# Patient Record
Sex: MALE | Race: WHITE | Employment: OTHER | URBAN - NONMETROPOLITAN AREA
[De-identification: names, ages, dates, MRNs, and addresses within clinical notes are randomized per-mention and may not be internally consistent; named-entity substitution may affect disease eponyms.]

---

## 2021-01-11 ENCOUNTER — APPOINTMENT (OUTPATIENT)
Dept: GENERAL RADIOLOGY | Age: 66
DRG: 291 | End: 2021-01-11
Payer: MEDICARE

## 2021-01-11 ENCOUNTER — HOSPITAL ENCOUNTER (INPATIENT)
Age: 66
LOS: 1 days | Discharge: LEFT BEFORE DISCHARGE | DRG: 291 | End: 2021-01-12
Attending: EMERGENCY MEDICINE
Payer: MEDICARE

## 2021-01-11 DIAGNOSIS — J18.9 PNEUMONIA OF BOTH LOWER LOBES DUE TO INFECTIOUS ORGANISM: ICD-10-CM

## 2021-01-11 DIAGNOSIS — I50.9 ACUTE ON CHRONIC CONGESTIVE HEART FAILURE, UNSPECIFIED HEART FAILURE TYPE (HCC): ICD-10-CM

## 2021-01-11 DIAGNOSIS — R06.02 SHORTNESS OF BREATH: Primary | ICD-10-CM

## 2021-01-11 PROBLEM — I26.09 ACUTE PULMONARY EMBOLISM WITH ACUTE COR PULMONALE (HCC): Status: ACTIVE | Noted: 2020-11-05

## 2021-01-11 PROBLEM — R06.01 ORTHOPNEA: Status: ACTIVE | Noted: 2020-05-10

## 2021-01-11 PROBLEM — J96.01 ACUTE RESPIRATORY FAILURE WITH HYPOXIA (HCC): Status: ACTIVE | Noted: 2020-05-10

## 2021-01-11 PROBLEM — Z72.0 TOBACCO USE: Status: ACTIVE | Noted: 2020-05-10

## 2021-01-11 PROBLEM — I42.8 NICM (NONISCHEMIC CARDIOMYOPATHY) (HCC): Status: ACTIVE | Noted: 2020-05-12

## 2021-01-11 PROBLEM — I42.9 CONGESTIVE HEART FAILURE DUE TO CARDIOMYOPATHY (HCC): Status: ACTIVE | Noted: 2021-01-11

## 2021-01-11 PROBLEM — J44.9 COPD (CHRONIC OBSTRUCTIVE PULMONARY DISEASE) (HCC): Status: ACTIVE | Noted: 2021-01-11

## 2021-01-11 PROBLEM — I26.99 PULMONARY EMBOLISM (HCC): Status: ACTIVE | Noted: 2021-01-11

## 2021-01-11 PROBLEM — Z91.14 NON COMPLIANCE W MEDICATION REGIMEN: Status: ACTIVE | Noted: 2021-01-11

## 2021-01-11 PROBLEM — I50.41 ACUTE COMBINED SYSTOLIC (CONGESTIVE) AND DIASTOLIC (CONGESTIVE) HEART FAILURE (HCC): Status: ACTIVE | Noted: 2020-05-10

## 2021-01-11 PROBLEM — E11.9 TYPE 2 DIABETES MELLITUS, WITHOUT LONG-TERM CURRENT USE OF INSULIN (HCC): Status: ACTIVE | Noted: 2020-11-05

## 2021-01-11 LAB
ALBUMIN SERPL-MCNC: 3 G/DL (ref 3.5–5.2)
ALP BLD-CCNC: 150 U/L (ref 40–130)
ALT SERPL-CCNC: 59 U/L (ref 5–41)
ANION GAP SERPL CALCULATED.3IONS-SCNC: 9 MMOL/L (ref 7–19)
APTT: 37.7 SEC (ref 26–36.2)
AST SERPL-CCNC: 57 U/L (ref 5–40)
BASOPHILS ABSOLUTE: 0 K/UL (ref 0–0.2)
BASOPHILS RELATIVE PERCENT: 0.3 % (ref 0–1)
BILIRUB SERPL-MCNC: 0.7 MG/DL (ref 0.2–1.2)
BUN BLDV-MCNC: 13 MG/DL (ref 8–23)
CALCIUM SERPL-MCNC: 8 MG/DL (ref 8.8–10.2)
CHLORIDE BLD-SCNC: 96 MMOL/L (ref 98–111)
CO2: 28 MMOL/L (ref 22–29)
CREAT SERPL-MCNC: 0.7 MG/DL (ref 0.5–1.2)
EKG P AXIS: 35 DEGREES
EKG P-R INTERVAL: 164 MS
EKG Q-T INTERVAL: 368 MS
EKG QRS DURATION: 96 MS
EKG QTC CALCULATION (BAZETT): 446 MS
EKG T AXIS: -17 DEGREES
EOSINOPHILS ABSOLUTE: 0.2 K/UL (ref 0–0.6)
EOSINOPHILS RELATIVE PERCENT: 1.5 % (ref 0–5)
GFR AFRICAN AMERICAN: >59
GFR NON-AFRICAN AMERICAN: >60
GLUCOSE BLD-MCNC: 152 MG/DL (ref 70–99)
GLUCOSE BLD-MCNC: 168 MG/DL (ref 70–99)
GLUCOSE BLD-MCNC: 176 MG/DL (ref 70–99)
GLUCOSE BLD-MCNC: 178 MG/DL (ref 74–109)
GLUCOSE BLD-MCNC: 193 MG/DL (ref 70–99)
GLUCOSE BLD-MCNC: 212 MG/DL (ref 70–99)
HCT VFR BLD CALC: 47.6 % (ref 42–52)
HEMOGLOBIN: 14.5 G/DL (ref 14–18)
IMMATURE GRANULOCYTES #: 0.1 K/UL
INR BLD: 1.24 (ref 0.88–1.18)
LV EF: 20 %
LVEF MODALITY: NORMAL
LYMPHOCYTES ABSOLUTE: 1.2 K/UL (ref 1.1–4.5)
LYMPHOCYTES RELATIVE PERCENT: 11.5 % (ref 20–40)
MAGNESIUM: 2 MG/DL (ref 1.6–2.4)
MCH RBC QN AUTO: 26.3 PG (ref 27–31)
MCHC RBC AUTO-ENTMCNC: 30.5 G/DL (ref 33–37)
MCV RBC AUTO: 86.2 FL (ref 80–94)
MONOCYTES ABSOLUTE: 0.9 K/UL (ref 0–0.9)
MONOCYTES RELATIVE PERCENT: 8.9 % (ref 0–10)
NEUTROPHILS ABSOLUTE: 8.1 K/UL (ref 1.5–7.5)
NEUTROPHILS RELATIVE PERCENT: 76.7 % (ref 50–65)
PDW BLD-RTO: 17.1 % (ref 11.5–14.5)
PERFORMED ON: ABNORMAL
PLATELET # BLD: 346 K/UL (ref 130–400)
PMV BLD AUTO: 9.4 FL (ref 9.4–12.4)
POTASSIUM SERPL-SCNC: 4.3 MMOL/L (ref 3.5–5)
POTASSIUM SERPL-SCNC: 4.3 MMOL/L (ref 3.5–5)
PRO-BNP: ABNORMAL PG/ML (ref 0–900)
PROTHROMBIN TIME: 15.6 SEC (ref 12–14.6)
RBC # BLD: 5.52 M/UL (ref 4.7–6.1)
REASON FOR REJECTION: NORMAL
REJECTED TEST: NORMAL
SARS-COV-2, NAAT: NOT DETECTED
SODIUM BLD-SCNC: 133 MMOL/L (ref 136–145)
T4 FREE: 1.05 NG/DL (ref 0.93–1.7)
TOTAL PROTEIN: 6 G/DL (ref 6.6–8.7)
TROPONIN: 0.07 NG/ML (ref 0–0.03)
TROPONIN: 0.09 NG/ML (ref 0–0.03)
TROPONIN: 0.09 NG/ML (ref 0–0.03)
TSH SERPL DL<=0.05 MIU/L-ACNC: 3 UIU/ML (ref 0.27–4.2)
WBC # BLD: 10.6 K/UL (ref 4.8–10.8)

## 2021-01-11 PROCEDURE — P9047 ALBUMIN (HUMAN), 25%, 50ML: HCPCS | Performed by: EMERGENCY MEDICINE

## 2021-01-11 PROCEDURE — 2700000000 HC OXYGEN THERAPY PER DAY

## 2021-01-11 PROCEDURE — 85025 COMPLETE CBC W/AUTO DIFF WBC: CPT

## 2021-01-11 PROCEDURE — 85610 PROTHROMBIN TIME: CPT

## 2021-01-11 PROCEDURE — 6360000002 HC RX W HCPCS

## 2021-01-11 PROCEDURE — 2500000003 HC RX 250 WO HCPCS: Performed by: INTERNAL MEDICINE

## 2021-01-11 PROCEDURE — 84132 ASSAY OF SERUM POTASSIUM: CPT

## 2021-01-11 PROCEDURE — C8929 TTE W OR WO FOL WCON,DOPPLER: HCPCS

## 2021-01-11 PROCEDURE — 6360000004 HC RX CONTRAST MEDICATION: Performed by: INTERNAL MEDICINE

## 2021-01-11 PROCEDURE — 99284 EMERGENCY DEPT VISIT MOD MDM: CPT

## 2021-01-11 PROCEDURE — 6370000000 HC RX 637 (ALT 250 FOR IP): Performed by: INTERNAL MEDICINE

## 2021-01-11 PROCEDURE — 2140000000 HC CCU INTERMEDIATE R&B

## 2021-01-11 PROCEDURE — 96376 TX/PRO/DX INJ SAME DRUG ADON: CPT

## 2021-01-11 PROCEDURE — 85730 THROMBOPLASTIN TIME PARTIAL: CPT

## 2021-01-11 PROCEDURE — 84439 ASSAY OF FREE THYROXINE: CPT

## 2021-01-11 PROCEDURE — 93010 ELECTROCARDIOGRAM REPORT: CPT | Performed by: INTERNAL MEDICINE

## 2021-01-11 PROCEDURE — 96375 TX/PRO/DX INJ NEW DRUG ADDON: CPT

## 2021-01-11 PROCEDURE — 93005 ELECTROCARDIOGRAM TRACING: CPT | Performed by: EMERGENCY MEDICINE

## 2021-01-11 PROCEDURE — 84484 ASSAY OF TROPONIN QUANT: CPT

## 2021-01-11 PROCEDURE — 83880 ASSAY OF NATRIURETIC PEPTIDE: CPT

## 2021-01-11 PROCEDURE — 82947 ASSAY GLUCOSE BLOOD QUANT: CPT

## 2021-01-11 PROCEDURE — 6360000002 HC RX W HCPCS: Performed by: INTERNAL MEDICINE

## 2021-01-11 PROCEDURE — 2580000003 HC RX 258: Performed by: EMERGENCY MEDICINE

## 2021-01-11 PROCEDURE — 80053 COMPREHEN METABOLIC PANEL: CPT

## 2021-01-11 PROCEDURE — 6360000002 HC RX W HCPCS: Performed by: EMERGENCY MEDICINE

## 2021-01-11 PROCEDURE — 99223 1ST HOSP IP/OBS HIGH 75: CPT | Performed by: INTERNAL MEDICINE

## 2021-01-11 PROCEDURE — 84443 ASSAY THYROID STIM HORMONE: CPT

## 2021-01-11 PROCEDURE — 71045 X-RAY EXAM CHEST 1 VIEW: CPT

## 2021-01-11 PROCEDURE — 96365 THER/PROPH/DIAG IV INF INIT: CPT

## 2021-01-11 PROCEDURE — 83735 ASSAY OF MAGNESIUM: CPT

## 2021-01-11 PROCEDURE — 94760 N-INVAS EAR/PLS OXIMETRY 1: CPT

## 2021-01-11 PROCEDURE — 94640 AIRWAY INHALATION TREATMENT: CPT

## 2021-01-11 PROCEDURE — 36415 COLL VENOUS BLD VENIPUNCTURE: CPT

## 2021-01-11 PROCEDURE — 2580000003 HC RX 258: Performed by: INTERNAL MEDICINE

## 2021-01-11 PROCEDURE — U0002 COVID-19 LAB TEST NON-CDC: HCPCS

## 2021-01-11 RX ORDER — METHYLPREDNISOLONE SODIUM SUCCINATE 125 MG/2ML
125 INJECTION, POWDER, LYOPHILIZED, FOR SOLUTION INTRAMUSCULAR; INTRAVENOUS ONCE
Status: COMPLETED | OUTPATIENT
Start: 2021-01-11 | End: 2021-01-11

## 2021-01-11 RX ORDER — LISINOPRIL 5 MG/1
5 TABLET ORAL DAILY
Status: DISCONTINUED | OUTPATIENT
Start: 2021-01-11 | End: 2021-01-11

## 2021-01-11 RX ORDER — SPIRONOLACTONE 25 MG/1
25 TABLET ORAL DAILY
Status: DISCONTINUED | OUTPATIENT
Start: 2021-01-11 | End: 2021-01-12 | Stop reason: HOSPADM

## 2021-01-11 RX ORDER — SODIUM CHLORIDE 0.9 % (FLUSH) 0.9 %
10 SYRINGE (ML) INJECTION PRN
Status: DISCONTINUED | OUTPATIENT
Start: 2021-01-11 | End: 2021-01-12 | Stop reason: HOSPADM

## 2021-01-11 RX ORDER — FUROSEMIDE 10 MG/ML
40 INJECTION INTRAMUSCULAR; INTRAVENOUS ONCE
Status: COMPLETED | OUTPATIENT
Start: 2021-01-11 | End: 2021-01-11

## 2021-01-11 RX ORDER — FUROSEMIDE 10 MG/ML
40 INJECTION INTRAMUSCULAR; INTRAVENOUS DAILY
Status: DISCONTINUED | OUTPATIENT
Start: 2021-01-11 | End: 2021-01-12 | Stop reason: HOSPADM

## 2021-01-11 RX ORDER — ACETAMINOPHEN 325 MG/1
650 TABLET ORAL EVERY 6 HOURS PRN
Status: DISCONTINUED | OUTPATIENT
Start: 2021-01-11 | End: 2021-01-12 | Stop reason: HOSPADM

## 2021-01-11 RX ORDER — FUROSEMIDE 10 MG/ML
20 INJECTION INTRAMUSCULAR; INTRAVENOUS ONCE
Status: COMPLETED | OUTPATIENT
Start: 2021-01-11 | End: 2021-01-11

## 2021-01-11 RX ORDER — ONDANSETRON 2 MG/ML
4 INJECTION INTRAMUSCULAR; INTRAVENOUS EVERY 6 HOURS PRN
Status: DISCONTINUED | OUTPATIENT
Start: 2021-01-11 | End: 2021-01-12 | Stop reason: HOSPADM

## 2021-01-11 RX ORDER — METHYLPREDNISOLONE SODIUM SUCCINATE 40 MG/ML
40 INJECTION, POWDER, LYOPHILIZED, FOR SOLUTION INTRAMUSCULAR; INTRAVENOUS EVERY 8 HOURS
Status: DISCONTINUED | OUTPATIENT
Start: 2021-01-11 | End: 2021-01-12 | Stop reason: HOSPADM

## 2021-01-11 RX ORDER — HYDROCODONE BITARTRATE AND ACETAMINOPHEN 10; 325 MG/1; MG/1
1 TABLET ORAL EVERY 4 HOURS PRN
Status: DISCONTINUED | OUTPATIENT
Start: 2021-01-11 | End: 2021-01-12 | Stop reason: HOSPADM

## 2021-01-11 RX ORDER — SODIUM CHLORIDE 0.9 % (FLUSH) 0.9 %
10 SYRINGE (ML) INJECTION EVERY 12 HOURS SCHEDULED
Status: DISCONTINUED | OUTPATIENT
Start: 2021-01-11 | End: 2021-01-12 | Stop reason: HOSPADM

## 2021-01-11 RX ORDER — BUDESONIDE AND FORMOTEROL FUMARATE DIHYDRATE 160; 4.5 UG/1; UG/1
2 AEROSOL RESPIRATORY (INHALATION) 2 TIMES DAILY
Status: DISCONTINUED | OUTPATIENT
Start: 2021-01-11 | End: 2021-01-11 | Stop reason: CLARIF

## 2021-01-11 RX ORDER — BUDESONIDE 0.5 MG/2ML
0.5 INHALANT ORAL 2 TIMES DAILY
Status: DISCONTINUED | OUTPATIENT
Start: 2021-01-11 | End: 2021-01-12 | Stop reason: HOSPADM

## 2021-01-11 RX ORDER — ACETAMINOPHEN 650 MG/1
650 SUPPOSITORY RECTAL EVERY 6 HOURS PRN
Status: DISCONTINUED | OUTPATIENT
Start: 2021-01-11 | End: 2021-01-12 | Stop reason: HOSPADM

## 2021-01-11 RX ORDER — NICOTINE 21 MG/24HR
1 PATCH, TRANSDERMAL 24 HOURS TRANSDERMAL DAILY
Status: DISCONTINUED | OUTPATIENT
Start: 2021-01-11 | End: 2021-01-12 | Stop reason: HOSPADM

## 2021-01-11 RX ORDER — POLYETHYLENE GLYCOL 3350 17 G/17G
17 POWDER, FOR SOLUTION ORAL DAILY PRN
Status: DISCONTINUED | OUTPATIENT
Start: 2021-01-11 | End: 2021-01-12 | Stop reason: HOSPADM

## 2021-01-11 RX ORDER — CARVEDILOL 6.25 MG/1
3.12 TABLET ORAL 2 TIMES DAILY
Status: DISCONTINUED | OUTPATIENT
Start: 2021-01-11 | End: 2021-01-12 | Stop reason: HOSPADM

## 2021-01-11 RX ORDER — BUDESONIDE AND FORMOTEROL FUMARATE DIHYDRATE 160; 4.5 UG/1; UG/1
2 AEROSOL RESPIRATORY (INHALATION)
COMMUNITY
Start: 2020-09-02

## 2021-01-11 RX ORDER — FUROSEMIDE 10 MG/ML
INJECTION INTRAMUSCULAR; INTRAVENOUS
Status: COMPLETED
Start: 2021-01-11 | End: 2021-01-11

## 2021-01-11 RX ORDER — IPRATROPIUM BROMIDE AND ALBUTEROL SULFATE 2.5; .5 MG/3ML; MG/3ML
1 SOLUTION RESPIRATORY (INHALATION)
Status: DISCONTINUED | OUTPATIENT
Start: 2021-01-11 | End: 2021-01-12 | Stop reason: HOSPADM

## 2021-01-11 RX ORDER — ALBUMIN (HUMAN) 12.5 G/50ML
25 SOLUTION INTRAVENOUS ONCE
Status: COMPLETED | OUTPATIENT
Start: 2021-01-11 | End: 2021-01-11

## 2021-01-11 RX ORDER — ARFORMOTEROL TARTRATE 15 UG/2ML
15 SOLUTION RESPIRATORY (INHALATION) 2 TIMES DAILY
Status: DISCONTINUED | OUTPATIENT
Start: 2021-01-11 | End: 2021-01-12 | Stop reason: HOSPADM

## 2021-01-11 RX ORDER — METHYLPREDNISOLONE SODIUM SUCCINATE 40 MG/ML
40 INJECTION, POWDER, LYOPHILIZED, FOR SOLUTION INTRAMUSCULAR; INTRAVENOUS EVERY 8 HOURS
Status: DISCONTINUED | OUTPATIENT
Start: 2021-01-11 | End: 2021-01-11

## 2021-01-11 RX ORDER — NITROGLYCERIN 0.4 MG/1
0.4 TABLET SUBLINGUAL EVERY 5 MIN PRN
Status: DISCONTINUED | OUTPATIENT
Start: 2021-01-11 | End: 2021-01-12 | Stop reason: HOSPADM

## 2021-01-11 RX ORDER — ASPIRIN 81 MG/1
81 TABLET ORAL DAILY
COMMUNITY
Start: 2020-05-14

## 2021-01-11 RX ORDER — POTASSIUM CHLORIDE 750 MG/1
10 CAPSULE, EXTENDED RELEASE ORAL DAILY
COMMUNITY
Start: 2020-09-02

## 2021-01-11 RX ADMIN — SODIUM CHLORIDE, PRESERVATIVE FREE 10 ML: 5 INJECTION INTRAVENOUS at 09:18

## 2021-01-11 RX ADMIN — IPRATROPIUM BROMIDE AND ALBUTEROL SULFATE 1 AMPULE: .5; 3 SOLUTION RESPIRATORY (INHALATION) at 19:26

## 2021-01-11 RX ADMIN — IPRATROPIUM BROMIDE AND ALBUTEROL SULFATE 1 AMPULE: .5; 3 SOLUTION RESPIRATORY (INHALATION) at 11:00

## 2021-01-11 RX ADMIN — ARFORMOTEROL TARTRATE 15 MCG: 15 SOLUTION RESPIRATORY (INHALATION) at 08:00

## 2021-01-11 RX ADMIN — PERFLUTREN 1.65 MG: 6.52 INJECTION, SUSPENSION INTRAVENOUS at 16:00

## 2021-01-11 RX ADMIN — METHYLPREDNISOLONE SODIUM SUCCINATE 40 MG: 40 INJECTION, POWDER, FOR SOLUTION INTRAMUSCULAR; INTRAVENOUS at 13:01

## 2021-01-11 RX ADMIN — CEFTRIAXONE 1 G: 1 INJECTION, POWDER, FOR SOLUTION INTRAMUSCULAR; INTRAVENOUS at 05:38

## 2021-01-11 RX ADMIN — INSULIN LISPRO 2 UNITS: 100 INJECTION, SOLUTION INTRAVENOUS; SUBCUTANEOUS at 23:39

## 2021-01-11 RX ADMIN — FAMOTIDINE 20 MG: 10 INJECTION, SOLUTION INTRAVENOUS at 23:39

## 2021-01-11 RX ADMIN — FAMOTIDINE 20 MG: 10 INJECTION, SOLUTION INTRAVENOUS at 09:18

## 2021-01-11 RX ADMIN — IPRATROPIUM BROMIDE AND ALBUTEROL SULFATE 1 AMPULE: .5; 3 SOLUTION RESPIRATORY (INHALATION) at 08:00

## 2021-01-11 RX ADMIN — CARVEDILOL 3.12 MG: 6.25 TABLET, FILM COATED ORAL at 09:18

## 2021-01-11 RX ADMIN — SPIRONOLACTONE 25 MG: 25 TABLET ORAL at 09:18

## 2021-01-11 RX ADMIN — ENOXAPARIN SODIUM 40 MG: 40 INJECTION SUBCUTANEOUS at 09:17

## 2021-01-11 RX ADMIN — ALBUMIN (HUMAN) 25 G: 0.25 INJECTION, SOLUTION INTRAVENOUS at 04:41

## 2021-01-11 RX ADMIN — FUROSEMIDE 20 MG: 10 INJECTION, SOLUTION INTRAMUSCULAR; INTRAVENOUS at 03:40

## 2021-01-11 RX ADMIN — FUROSEMIDE 40 MG: 10 INJECTION, SOLUTION INTRAMUSCULAR; INTRAVENOUS at 04:42

## 2021-01-11 RX ADMIN — APIXABAN 5 MG: 5 TABLET, FILM COATED ORAL at 23:39

## 2021-01-11 RX ADMIN — INSULIN LISPRO 3 UNITS: 100 INJECTION, SOLUTION INTRAVENOUS; SUBCUTANEOUS at 13:01

## 2021-01-11 RX ADMIN — BUDESONIDE 500 MCG: 0.5 SUSPENSION RESPIRATORY (INHALATION) at 08:00

## 2021-01-11 RX ADMIN — INSULIN LISPRO 3 UNITS: 100 INJECTION, SOLUTION INTRAVENOUS; SUBCUTANEOUS at 16:54

## 2021-01-11 RX ADMIN — SODIUM CHLORIDE, PRESERVATIVE FREE 10 ML: 5 INJECTION INTRAVENOUS at 23:40

## 2021-01-11 RX ADMIN — FUROSEMIDE 40 MG: 10 INJECTION, SOLUTION INTRAMUSCULAR; INTRAVENOUS at 09:17

## 2021-01-11 RX ADMIN — LISINOPRIL 5 MG: 5 TABLET ORAL at 09:18

## 2021-01-11 RX ADMIN — METHYLPREDNISOLONE SODIUM SUCCINATE 125 MG: 125 INJECTION, POWDER, FOR SOLUTION INTRAMUSCULAR; INTRAVENOUS at 05:37

## 2021-01-11 RX ADMIN — FUROSEMIDE 40 MG: 10 INJECTION, SOLUTION INTRAMUSCULAR; INTRAVENOUS at 01:11

## 2021-01-11 RX ADMIN — APIXABAN 5 MG: 5 TABLET, FILM COATED ORAL at 13:46

## 2021-01-11 RX ADMIN — Medication 500 MG: at 05:38

## 2021-01-11 RX ADMIN — METHYLPREDNISOLONE SODIUM SUCCINATE 40 MG: 40 INJECTION, POWDER, FOR SOLUTION INTRAMUSCULAR; INTRAVENOUS at 23:39

## 2021-01-11 RX ADMIN — HYDROCODONE BITARTRATE AND ACETAMINOPHEN 1 TABLET: 10; 325 TABLET ORAL at 09:24

## 2021-01-11 RX ADMIN — FUROSEMIDE 40 MG: 10 INJECTION INTRAMUSCULAR; INTRAVENOUS at 01:11

## 2021-01-11 RX ADMIN — CARVEDILOL 3.12 MG: 6.25 TABLET, FILM COATED ORAL at 23:38

## 2021-01-11 ASSESSMENT — ENCOUNTER SYMPTOMS
TROUBLE SWALLOWING: 0
EYE PAIN: 0
ALLERGIC/IMMUNOLOGIC NEGATIVE: 1
GASTROINTESTINAL NEGATIVE: 1
DIARRHEA: 0
BACK PAIN: 0
SINUS PRESSURE: 0
PHOTOPHOBIA: 0
NAUSEA: 0
EYES NEGATIVE: 1
SHORTNESS OF BREATH: 0
CONSTIPATION: 0
RESPIRATORY NEGATIVE: 1
SHORTNESS OF BREATH: 1
VOMITING: 0
COLOR CHANGE: 0
ABDOMINAL PAIN: 0
COUGH: 1
WHEEZING: 1
WHEEZING: 0
CHEST TIGHTNESS: 1

## 2021-01-11 ASSESSMENT — PAIN SCALES - GENERAL: PAINLEVEL_OUTOF10: 0

## 2021-01-11 NOTE — ED NOTES
PT reports that his left arm is cold and tingling. PT IV is not infiltrated, and flushes well. ABX have been paused to give PT arm a break.       Mariano Jeffery RN  01/11/21 9349

## 2021-01-11 NOTE — PROGRESS NOTES
Geryl Severs arrived to room # 723. Presented with: CHF  Mental Status: Patient is oriented, alert, coherent, logical, thought processes intact and able to concentrate and follow conversation. Vitals:    01/11/21 0732   BP: 96/68   Pulse: 101   Resp: 18   Temp: 97.5 °F (36.4 °C)   SpO2: 90%     Patient safety contract and falls prevention contract reviewed with patient Yes. Oriented Patient to room. Call light within reach. Yes.   Needs, issues or concerns expressed at this time: no.      Electronically signed by Janie Veloz RN on 1/11/2021 at 7:44 AM

## 2021-01-11 NOTE — ED PROVIDER NOTES
PAST MEDICALHISTORY     Past Medical History:   Diagnosis Date    Back pain     CHF (congestive heart failure) (HCC)     Chronic back pain          SURGICAL HISTORY     History reviewed. No pertinent surgical history. CURRENT MEDICATIONS     Previous Medications    APIXABAN STARTER PACK (ELIQUIS DVT/PE STARTER PACK) 5 MG TBPK TABLET    Take 5 mg by mouth    ASPIRIN 81 MG EC TABLET    Take 81 mg by mouth daily    BUDESONIDE-FORMOTEROL (SYMBICORT) 160-4.5 MCG/ACT AERO    Inhale 2 puffs into the lungs Twice daily    CARVEDILOL (COREG PO)    Take by mouth    FUROSEMIDE (LASIX PO)    Take by mouth    HYDROCODONE-ACETAMINOPHEN (NORCO)  MG PER TABLET    Take 1 tablet by mouth every 4 hours as needed for Pain    POTASSIUM CHLORIDE (MICRO-K) 10 MEQ EXTENDED RELEASE CAPSULE    Take 10 mEq by mouth daily       ALLERGIES     Penicillins    FAMILY HISTORY     History reviewed. No pertinent family history.        SOCIAL HISTORY       Social History     Socioeconomic History    Marital status: Single     Spouse name: None    Number of children: None    Years of education: None    Highest education level: None   Occupational History    None   Social Needs    Financial resource strain: None    Food insecurity     Worry: None     Inability: None    Transportation needs     Medical: None     Non-medical: None   Tobacco Use    Smoking status: Current Every Day Smoker     Packs/day: 1.00     Types: Cigarettes   Substance and Sexual Activity    Alcohol use: No    Drug use: No    Sexual activity: None   Lifestyle    Physical activity     Days per week: None     Minutes per session: None    Stress: None   Relationships    Social connections     Talks on phone: None     Gets together: None     Attends Cheondo[employer] service: None     Active member of club or organization: None     Attends meetings of clubs or organizations: None     Relationship status: None    Intimate partner violence Palpations: Abdomen is soft. Tenderness: There is no abdominal tenderness. There is no guarding. Musculoskeletal:         General: Tenderness present. Right lower leg: He exhibits tenderness. Edema (3+ with weeping) present. Left lower leg: He exhibits tenderness. Edema (3+ with weeping) present. Skin:     General: Skin is warm and dry. Capillary Refill: Capillary refill takes less than 2 seconds. Neurological:      General: No focal deficit present. Mental Status: He is alert and oriented to person, place, and time. Cranial Nerves: No cranial nerve deficit. Psychiatric:         Mood and Affect: Mood normal.         Behavior: Behavior normal.         Thought Content: Thought content normal.         DIAGNOSTIC RESULTS     EKG: All EKG's areinterpreted by the Emergency Department Physician who either signs or Co-signs this chart in the absence of a cardiologist.    Sinus tach with rate of 108, normal axis, PVCs present occasionally, no acute ST elevations or depressions. No image comparison available    RADIOLOGY:  Non-plain film images such as CT, Ultrasound and MRI are read by the radiologist. Plain radiographic images are visualized and preliminarily interpreted bythe emergency physician with the below findings:    FILM CXR 1 VIEW:    Comparison: 1/2/2013    Opacities within the mid to lower lungs likely infectious. Small right pleural effusion. Unremarkable cardiomediastinal silhouette and osseous structures.       XR CHEST PORTABLE    (Results Pending)           LABS:  Labs Reviewed   BRAIN NATRIURETIC PEPTIDE - Abnormal; Notable for the following components:       Result Value    Pro-BNP 11,297 (*)     All other components within normal limits   CBC WITH AUTO DIFFERENTIAL - Abnormal; Notable for the following components:    MCH 26.3 (*)     MCHC 30.5 (*)     RDW 17.1 (*)     Neutrophils % 76.7 (*)     Lymphocytes % 11.5 (*)     Neutrophils Absolute 8.1 (*) All other components within normal limits   COMPREHENSIVE METABOLIC PANEL - Abnormal; Notable for the following components:    Sodium 133 (*)     Chloride 96 (*)     Glucose 178 (*)     Calcium 8.0 (*)     Total Protein 6.0 (*)     Alb 3.0 (*)     Alkaline Phosphatase 150 (*)     ALT 59 (*)     AST 57 (*)     All other components within normal limits   TROPONIN - Abnormal; Notable for the following components:    Troponin 0.09 (*)     All other components within normal limits   APTT - Abnormal; Notable for the following components:    aPTT 37.7 (*)     All other components within normal limits   PROTIME-INR - Abnormal; Notable for the following components:    Protime 15.6 (*)     INR 1.24 (*)     All other components within normal limits   TROPONIN - Abnormal; Notable for the following components:    Troponin 0.09 (*)     All other components within normal limits   COVID-19   SPECIMEN REJECTION    Narrative:     ORDER WAS CANCELLED 01/11/2021 05:14, Rejected: Hemolyzed ?called rafiq baez rn/er for recollection. MAGNESIUM   POTASSIUM   POCT GLUCOSE   POCT GLUCOSE   POCT GLUCOSE       All other labs were within normal range or not returned as of this dictation.     EMERGENCY DEPARTMENT COURSE and DIFFERENTIAL DIAGNOSIS/MDM:   Vitals:    Vitals:    01/11/21 0405 01/11/21 0435 01/11/21 0535 01/11/21 0600   BP: (!) 106/93 (!) 115/92 (!) 114/90 114/78   Pulse: 118 112 110 117   Resp: 27 19 (!) 31 21   Temp:       TempSrc:       SpO2: 94% 93% 92% 96%   Weight:       Height:           MDM  Number of Diagnoses or Management Options  Acute on chronic congestive heart failure, unspecified heart failure type [facility]): new and requires workup  Pneumonia of both lower lobes due to infectious organism: new and requires workup  Shortness of breath: new and requires workup Diagnosis management comments: Pt's echo on 11/5/20 showed decrease EF at 26-30% . Patient appears to be fluid overloaded. He has 3+ lower extremity edema as well as edema in his hands and forearms bilaterally. I tried to diurese him here in the ED, but it was not successful. He continued to be tachypneic and tachycardic. Since he has not been on his medication for a few days and appears to be fluid overloaded I feel best to admit. I spoke to the hospitalist service who have agreed to admit him for further evaluation and treatment. Amount and/or Complexity of Data Reviewed  Decide to obtain previous medical records or to obtain history from someone other than the patient: yes    Patient Progress  Patient progress: stable  Echo 11/5/20    Left ventricular systolic function is moderately to severely decreased. Left ventricular ejection fraction appears to be 26 - 30%. · The left ventricular cavity is mild to moderately dilated. · Normal right ventricular cavity size and systolic function noted. · Mild to moderate mitral valve regurgitation is present. · Mild tricuspid valve regurgitation is present. Estimated right   ventricular systolic pressure from tricuspid regurgitation is moderately   elevated (45-55 mmHg). Reassessment      CONSULTS:  IP CONSULT TO HEART FAILURE NURSE/COORDINATOR  IP CONSULT TO DIETITIAN  IP CONSULT TO CARDIOLOGY    PROCEDURES:  Unless otherwise noted below, none     Procedures    FINAL IMPRESSION      1. Shortness of breath    2. Acute on chronic congestive heart failure, unspecified heart failure type ([address]    3. Pneumonia of both lower lobes due to infectious organism          DISPOSITION/PLAN   DISPOSITION Admitted 01/11/2021 05:42:23 AM      PATIENT REFERRED TO:  No follow-up provider specified.     DISCHARGE MEDICATIONS:  New Prescriptions    No medications on file (Please note that portions of this note were completed with a voice recognition program.  Efforts were made to edit thedictations but occasionally words are mis-transcribed.)    Mirella Lim MD (electronically signed)  Attending Emergency Physician          Deb Reaves MD  01/11/21 9672

## 2021-01-11 NOTE — CARE COORDINATION
Requested to ensure affordability of pt meds: Entresto and Eliquis. SW contacted pt's pharmacy [facility] who confirmed pt copay for both meds Entresto & Eliquis as $4. Informed cardio TIM Lopez.

## 2021-01-11 NOTE — CONSULTS
[facility]  Cardiology Consult      Requesting MD:  Mendel Mustard, MD   Admit Status:  Inpatient [101]       History obtained from:   [] Patient  [] Other (specify):     Patient:  Lewis Cox  [id number]     Chief Complaint:   Chief Complaint   Patient presents with    Shortness of Breath     hx of CHF         HPI: Mr. Lolis Montgomery is a 60 y.o. male with no prior cardiac history now comes in several day history of worsening dyspnea and edema. Ran out of his medications a week ago. No definite prior cardiac history or work-up. No fever chills or cough. Denies chest pain. proBNP level 11,297. Troponin 0 0.07, 0.09, and 0.09. Covid19 test negative. Chest x-ray bilateral lung infiltrates more severe on the right side moderate fullness of the right hilum may represent infiltrate or lymphadenopathy small right basal pleural effusion. Admitted with COPD exacerbation probable right lower lobe pneumonia and congestion. Review of Systems:  Review of Systems   Constitutional: Negative. Negative for chills, fever and unexpected weight change. HENT: Negative. Eyes: Negative. Respiratory: Negative. Negative for shortness of breath. Cardiovascular: Negative. Negative for chest pain. Gastrointestinal: Negative. Negative for diarrhea, nausea and vomiting. Endocrine: Negative. Genitourinary: Negative. Musculoskeletal: Negative. Skin: Negative. Neurological: Negative. All other systems reviewed and are negative.       Cardiac Specific Data:  Specialty Problems        Cardiology Problems    Acute combined systolic (congestive) and diastolic (congestive) heart failure (HCC)        NICM (nonischemic cardiomyopathy) (HCC)        Acute pulmonary embolism with acute cor pulmonale (HCC)        Congestive heart failure due to cardiomyopathy [facility])              Past Medical History:  Past Medical History:   Diagnosis Date    Back pain     CHF (congestive heart failure) ([facility] [address] Medication Sig Start Date End Date Taking?  Authorizing Provider   Furosemide (LASIX PO) Take 20 mg by mouth daily    Yes Historical Provider, MD   Carvedilol (COREG PO) Take 6.25 mg by mouth 2 times daily    Yes Historical Provider, MD   aspirin 81 MG EC tablet Take 81 mg by mouth daily 5/14/20  Yes Historical Provider, MD   budesonide-formoterol (SYMBICORT) 160-4.5 MCG/ACT AERO Inhale 2 puffs into the lungs Twice daily 9/2/20  Yes Historical Provider, MD   potassium chloride (MICRO-K) 10 MEQ extended release capsule Take 10 mEq by mouth daily 9/2/20  Yes Historical Provider, MD   HYDROcodone-acetaminophen ([facility])  MG per tablet Take 1 tablet by mouth every 4 hours as needed for Pain   Yes Historical Provider, MD       Current Meds:   carvedilol  3.125 mg Oral BID    sodium chloride flush  10 mL Intravenous 2 times per day    enoxaparin  40 mg Subcutaneous Daily    nicotine  1 patch Transdermal Daily    spironolactone  25 mg Oral Daily    furosemide  40 mg Intravenous Daily    famotidine (PEPCID) injection  20 mg Intravenous BID    ipratropium-albuterol  1 ampule Inhalation Q4H WA    insulin lispro  0-18 Units Subcutaneous TID WC    insulin lispro  0-9 Units Subcutaneous Nightly    methylPREDNISolone  40 mg Intravenous Q8H    budesonide  0.5 mg Nebulization BID    And    Arformoterol Tartrate  15 mcg Nebulization BID    [START ON 1/12/2021] cefTRIAXone (ROCEPHIN) IV  1 g Intravenous Q24H    [START ON 1/12/2021] azithromycin  500 mg Intravenous Q24H    [START ON 1/12/2021] sacubitril-valsartan  1 tablet Oral BID       Current Infused Meds:      Physical Exam:  Vitals:    01/11/21 1101   BP:    Pulse:    Resp:    Temp:    SpO2: 99%       Intake/Output Summary (Last 24 hours) at 1/11/2021 1203  Last data filed at 1/11/2021 1142  Gross per 24 hour   Intake 120 ml   Output 3150 ml   Net -3030 ml     Estimated body mass index is 27.26 kg/m² as calculated from the following: Height as of this encounter: 6' 1\" (1.854 m). Weight as of this encounter: 206 lb 9.6 oz (93.7 kg). Physical Exam  Vitals signs reviewed. Constitutional:       General: He is not in acute distress. Appearance: He is well-developed and normal weight. He is not ill-appearing, toxic-appearing or diaphoretic. HENT:      Head: Normocephalic and atraumatic. Nose: Nose normal.      Mouth/Throat:      Mouth: Mucous membranes are moist.      Pharynx: Oropharynx is clear. Eyes:      General: No scleral icterus. Extraocular Movements: Extraocular movements intact. Pupils: Pupils are equal, round, and reactive to light. Neck:      Musculoskeletal: Normal range of motion and neck supple. No neck rigidity or muscular tenderness. Vascular: No carotid bruit or JVD. Cardiovascular:      Rate and Rhythm: Normal rate and regular rhythm. Heart sounds: Normal heart sounds. No murmur. No friction rub. No gallop. Pulmonary:      Effort: Pulmonary effort is normal. No respiratory distress. Breath sounds: Normal breath sounds. No stridor. No wheezing, rhonchi or rales. Chest:      Chest wall: No tenderness. Abdominal:      General: Abdomen is flat. Bowel sounds are normal. There is no distension. Palpations: Abdomen is soft. There is no mass. Tenderness: There is no abdominal tenderness. There is no right CVA tenderness, left CVA tenderness, guarding or rebound. Hernia: No hernia is present. Musculoskeletal:         General: No swelling, tenderness, deformity or signs of injury. Right lower leg: No edema. Left lower leg: No edema. Lymphadenopathy:      Cervical: No cervical adenopathy. Skin:     General: Skin is warm and dry. Neurological:      General: No focal deficit present. Mental Status: He is alert and oriented to person, place, and time. Mental status is at baseline. Cranial Nerves: No cranial nerve deficit. Sensory: No sensory deficit. Motor: No weakness. Coordination: Coordination normal.   Psychiatric:         Mood and Affect: Mood normal.         Behavior: Behavior normal.         Thought Content: Thought content normal.         Judgment: Judgment normal.         Labs:  Recent Labs     01/11/21 0100   WBC 10.6   HGB 14.5          Recent Labs     01/11/21 0100 01/11/21  0537   *  --    K 4.3 4.3   CL 96*  --    CO2 28  --    BUN 13  --    CREATININE 0.7  --    LABGLOM >60  --    MG  --  2.0   CALCIUM 8.0*  --        CK, CKMB, Troponin: @LABRCNT (CKTOTAL:3, CKMB:3, TROPONINI:3)@    Last 3 BNP:  No results for input(s): BNP in the last 72 hours. IMAGING:  Xr Chest Portable    Result Date: 1/11/2021  Examination. XR CHEST PORTABLE 1/11/2021 12:05 AM History: Shortness of breath. A single frontal portable upright view of the chest is compared with the previous study dated 11/17/2015. There is interstitial infiltrate in the lungs bilaterally and ill-defined nodular opacities in the right mid the right lower lung. There is a lobulated fullness of the right hilum which may represent mass or lymphadenopathy. There is a small right basal pleural effusion. There are no pulmonary congestion or pneumothorax. Bilateral lung infiltrate, more severe on the right side. Moderate fullness of the right hilum may represent infiltrate/or lymphadenopathy. A small right basal pleural effusion. The above study was initially reviewed and reported by [facility]. I do not find any discrepancies. Signed by Dr Jazmín Cedeño on 1/11/2021 6:51 AM      Assessment:  1. Several day history of worsening edema and shortness of breath proBNP 11,297 component of congestive heart failure  2. Abnormal chest x-ray as described? Bilateral pneumonia fullness of the right hilum  3. Diabetes mellitus  4. Tobacco abuse ongoing  5.   No clinical angina 6.  Troponins minimally elevated 8.844.50 of uncertain clinical significance no clear-cut trend  7. Acute respiratory failure  History of nonischemic cardiomyopathy        Recommendations:  1. Echocardiogram  2. Diuresis  3. Antibiotics as you are doing  4. Consideration for CT of the chest  5.  Further comments to follow

## 2021-01-11 NOTE — PROGRESS NOTES
Post midnight admission    62-year-old male with a past medical history of tobacco abuse, systolic/diastolic CHF, pulmonary embolism, COPD noncompliant with medications presenting to the hospital for shortness of breath found to have elevated BNP and elevated troponin with cardiology consulted on admission. Patient was also noted to have pneumonia and is currently being treated with empiric antibiotics. Patient seems to be improving with current therapy. Supportive management. The importance of medication compliance has been stressed to the patient.

## 2021-01-11 NOTE — PROGRESS NOTES
Pt is not sure about the home medication Lasix, coreg dosage. Will call [facility](opens at 9:00am) to verify the medication. Coreg 6.25 mg BID, Lasix 20 mg Daily per [facility]. Home medication list updated.

## 2021-01-11 NOTE — H&P
Prior to Admission medications    Medication Sig Start Date End Date Taking? Authorizing Provider   Furosemide (LASIX PO) Take by mouth   Yes Historical Provider, MD   Carvedilol (COREG PO) Take by mouth   Yes Historical Provider, MD   apixaban starter pack (ELIQUIS DVT/PE STARTER PACK) 5 MG TBPK tablet Take 5 mg by mouth 11/6/20  Yes Historical Provider, MD   aspirin 81 MG EC tablet Take 81 mg by mouth daily 5/14/20  Yes Historical Provider, MD   budesonide-formoterol (SYMBICORT) 160-4.5 MCG/ACT AERO Inhale 2 puffs into the lungs Twice daily 9/2/20  Yes Historical Provider, MD   potassium chloride (MICRO-K) 10 MEQ extended release capsule Take 10 mEq by mouth daily 9/2/20  Yes Historical Provider, MD   HYDROcodone-acetaminophen (Keena Jamaal)  MG per tablet Take 1 tablet by mouth every 4 hours as needed for Pain    Historical Provider, MD        Allergies   Penicillins    Social History     Social History     Tobacco History     Smoking Status  Current Every Day Smoker Smoking Frequency  1 pack/day Smoking Tobacco Type  Cigarettes    Smokeless Tobacco Use  Unknown          Alcohol History     Alcohol Use Status  No          Drug Use     Drug Use Status  No          Sexual Activity     Sexually Active  Not Asked                Family History   History reviewed. No pertinent family history. Review of Systems   Review of Systems   Constitutional: Positive for activity change, fatigue and unexpected weight change. HENT: Negative. Eyes: Negative. Respiratory: Positive for cough, shortness of breath and wheezing. Cardiovascular: Positive for palpitations and leg swelling. Gastrointestinal: Negative. Endocrine: Positive for heat intolerance. Genitourinary: Negative. Musculoskeletal: Positive for arthralgias, gait problem and joint swelling. Allergic/Immunologic: Negative. Neurological: Positive for dizziness and weakness. Hematological: Negative. Psychiatric/Behavioral: Positive for agitation. All other systems reviewed and are negative. Physical Exam   BP (!) 114/90   Pulse 110   Temp 97.3 °F (36.3 °C) (Temporal)   Resp (!) 31   Ht 6' 1\" (1.854 m)   Wt 200 lb (90.7 kg)   SpO2 92%   BMI 26.39 kg/m²     Physical Exam  Vitals signs and nursing note reviewed. Constitutional:       General: He is in acute distress. Appearance: He is obese. He is ill-appearing and diaphoretic. HENT:      Head: Normocephalic. Nose: Nose normal.      Mouth/Throat:      Mouth: Mucous membranes are moist.   Eyes:      Extraocular Movements: Extraocular movements intact. Conjunctiva/sclera: Conjunctivae normal.   Cardiovascular:      Rate and Rhythm: Regular rhythm. Tachycardia present. Heart sounds: Murmur present. Pulmonary:      Breath sounds: Wheezing and rales present. Abdominal:      Comments: Morbid obesity   Non distended, non tender    Musculoskeletal:         General: Swelling present. Right lower leg: Edema present. Left lower leg: Edema present. Skin:     General: Skin is warm. Neurological:      General: No focal deficit present. Mental Status: He is alert.    Psychiatric:         Mood and Affect: Mood normal.            Labs      Recent Results (from the past 24 hour(s))   Brain Natriuretic Peptide    Collection Time: 01/11/21  1:00 AM   Result Value Ref Range    Pro-BNP 11,297 (H) 0 - 900 pg/mL   CBC Auto Differential    Collection Time: 01/11/21  1:00 AM   Result Value Ref Range    WBC 10.6 4.8 - 10.8 K/uL    RBC 5.52 4.70 - 6.10 M/uL    Hemoglobin 14.5 14.0 - 18.0 g/dL    Hematocrit 47.6 42.0 - 52.0 %    MCV 86.2 80.0 - 94.0 fL    MCH 26.3 (L) 27.0 - 31.0 pg    MCHC 30.5 (L) 33.0 - 37.0 g/dL    RDW 17.1 (H) 11.5 - 14.5 %    Platelets 548 309 - 968 K/uL    MPV 9.4 9.4 - 12.4 fL    Neutrophils % 76.7 (H) 50.0 - 65.0 %    Lymphocytes % 11.5 (L) 20.0 - 40.0 %    Monocytes % 8.9 0.0 - 10.0 % Eosinophils % 1.5 0.0 - 5.0 %    Basophils % 0.3 0.0 - 1.0 %    Neutrophils Absolute 8.1 (H) 1.5 - 7.5 K/uL    Immature Granulocytes # 0.1 K/uL    Lymphocytes Absolute 1.2 1.1 - 4.5 K/uL    Monocytes Absolute 0.90 0.00 - 0.90 K/uL    Eosinophils Absolute 0.20 0.00 - 0.60 K/uL    Basophils Absolute 0.00 0.00 - 0.20 K/uL   Comprehensive Metabolic Panel    Collection Time: 01/11/21  1:00 AM   Result Value Ref Range    Sodium 133 (L) 136 - 145 mmol/L    Potassium 4.3 3.5 - 5.0 mmol/L    Chloride 96 (L) 98 - 111 mmol/L    CO2 28 22 - 29 mmol/L    Anion Gap 9 7 - 19 mmol/L    Glucose 178 (H) 74 - 109 mg/dL    BUN 13 8 - 23 mg/dL    CREATININE 0.7 0.5 - 1.2 mg/dL    GFR Non-African American >60 >60    GFR African American >59 >59    Calcium 8.0 (L) 8.8 - 10.2 mg/dL    Total Protein 6.0 (L) 6.6 - 8.7 g/dL    Alb 3.0 (L) 3.5 - 5.2 g/dL    Total Bilirubin 0.7 0.2 - 1.2 mg/dL    Alkaline Phosphatase 150 (H) 40 - 130 U/L    ALT 59 (H) 5 - 41 U/L    AST 57 (H) 5 - 40 U/L   Troponin    Collection Time: 01/11/21  1:00 AM   Result Value Ref Range    Troponin 0.09 (H) 0.00 - 0.03 ng/mL   APTT    Collection Time: 01/11/21  1:00 AM   Result Value Ref Range    aPTT 37.7 (H) 26.0 - 36.2 sec   Protime-INR    Collection Time: 01/11/21  1:00 AM   Result Value Ref Range    Protime 15.6 (H) 12.0 - 14.6 sec    INR 1.24 (H) 0.88 - 1.18   COVID-19    Collection Time: 01/11/21  3:15 AM   Result Value Ref Range    SARS-CoV-2, NAAT Not Detected Not Detected   Troponin    Collection Time: 01/11/21  3:30 AM   Result Value Ref Range    Troponin 0.09 (H) 0.00 - 0.03 ng/mL   SPECIMEN REJECTION    Collection Time: 01/11/21  5:15 AM   Result Value Ref Range    Rejected Test mg, k     Reason for Rejection see below         Imaging/Diagnostics Last 24 Hours   No results found.     Assessment      Hospital Problems           Last Modified POA    Congestive heart failure due to cardiomyopathy ([facility] [address] 1/11/2021 Yes acute on chronic hypoxemic respiratory failure   Multifactorial   Copd exacerbation   CAP   Right lower lobe pneumonia   Cough   Acute on chronic systolic and diastolic heart failure, worsened with swelling and peripheral edema and weight gain of 20 lbs     Obesity     Type II DM uncontrolled     Cor pulmonale       Needs evaluation   With cardiology     Needs a pcp        Plan   1. Acute on chronic systolic and diastolic heart failure. Diurese   Intake and out put monitor   Follow bnp   Echo ordered   Monitor labs. Cardiology consultation     2. Copd exacerbation   Still smoking   And needs respiratory care plan and steroids. Bronchodilators. 3.  Probable right lower lobe pneumonia and congestion   Started on abx therapy and bronchodilators. Supplemental oxygen     4. Type ii dm uncontrolled   Blood sugars   Insulin sliding scale. 5.  ckd     6. Elevated troponin   Monitor     7. Elevated liver tests, fatty liver or hepatic congestion due to chf.     8.  Low albumin     9. Obesity     Sedentary     Due to severe heart and lung disease, also need to consider consultation with palliative care, for end of life care and comfort measures. 10.  dvt and gi prophylaxis.          Consultations Ordered:  IP CONSULT TO HEART FAILURE NURSE/COORDINATOR  IP CONSULT TO DIETITIAN  IP CONSULT TO CARDIOLOGY    Electronically signed by Sukhdev Hensley MD on 1/11/21 at 5:43 AM CST

## 2021-01-12 VITALS
DIASTOLIC BLOOD PRESSURE: 63 MMHG | OXYGEN SATURATION: 90 % | TEMPERATURE: 97.5 F | WEIGHT: 206.6 LBS | HEART RATE: 103 BPM | HEIGHT: 73 IN | SYSTOLIC BLOOD PRESSURE: 91 MMHG | RESPIRATION RATE: 16 BRPM | BODY MASS INDEX: 27.38 KG/M2

## 2021-01-12 LAB
ALBUMIN SERPL-MCNC: 3.1 G/DL (ref 3.5–5.2)
ALP BLD-CCNC: 122 U/L (ref 40–130)
ALT SERPL-CCNC: 46 U/L (ref 5–41)
ANION GAP SERPL CALCULATED.3IONS-SCNC: 14 MMOL/L (ref 7–19)
AST SERPL-CCNC: 31 U/L (ref 5–40)
BASOPHILS ABSOLUTE: 0 K/UL (ref 0–0.2)
BASOPHILS RELATIVE PERCENT: 0.1 % (ref 0–1)
BILIRUB SERPL-MCNC: 0.7 MG/DL (ref 0.2–1.2)
BUN BLDV-MCNC: 20 MG/DL (ref 8–23)
CALCIUM SERPL-MCNC: 8.3 MG/DL (ref 8.8–10.2)
CHLORIDE BLD-SCNC: 99 MMOL/L (ref 98–111)
CHOLESTEROL, TOTAL: 104 MG/DL (ref 160–199)
CO2: 26 MMOL/L (ref 22–29)
CREAT SERPL-MCNC: 1 MG/DL (ref 0.5–1.2)
EOSINOPHILS ABSOLUTE: 0 K/UL (ref 0–0.6)
EOSINOPHILS RELATIVE PERCENT: 0 % (ref 0–5)
GFR AFRICAN AMERICAN: >59
GFR NON-AFRICAN AMERICAN: >60
GLUCOSE BLD-MCNC: 161 MG/DL (ref 74–109)
HBA1C MFR BLD: 7.1 % (ref 4–6)
HCT VFR BLD CALC: 42.5 % (ref 42–52)
HDLC SERPL-MCNC: 34 MG/DL (ref 55–121)
HEMOGLOBIN: 13.1 G/DL (ref 14–18)
IMMATURE GRANULOCYTES #: 0.1 K/UL
LDL CHOLESTEROL CALCULATED: 56 MG/DL
LYMPHOCYTES ABSOLUTE: 0.6 K/UL (ref 1.1–4.5)
LYMPHOCYTES RELATIVE PERCENT: 5.3 % (ref 20–40)
MAGNESIUM: 2.1 MG/DL (ref 1.6–2.4)
MCH RBC QN AUTO: 26.4 PG (ref 27–31)
MCHC RBC AUTO-ENTMCNC: 30.8 G/DL (ref 33–37)
MCV RBC AUTO: 85.5 FL (ref 80–94)
MONOCYTES ABSOLUTE: 0.6 K/UL (ref 0–0.9)
MONOCYTES RELATIVE PERCENT: 4.9 % (ref 0–10)
NEUTROPHILS ABSOLUTE: 10.3 K/UL (ref 1.5–7.5)
NEUTROPHILS RELATIVE PERCENT: 88.9 % (ref 50–65)
PDW BLD-RTO: 16.7 % (ref 11.5–14.5)
PLATELET # BLD: 343 K/UL (ref 130–400)
PMV BLD AUTO: 9.3 FL (ref 9.4–12.4)
POTASSIUM SERPL-SCNC: 4.5 MMOL/L (ref 3.5–5)
RBC # BLD: 4.97 M/UL (ref 4.7–6.1)
SODIUM BLD-SCNC: 139 MMOL/L (ref 136–145)
TOTAL PROTEIN: 5.7 G/DL (ref 6.6–8.7)
TRIGL SERPL-MCNC: 72 MG/DL (ref 0–149)
WBC # BLD: 11.6 K/UL (ref 4.8–10.8)

## 2021-01-12 PROCEDURE — 80061 LIPID PANEL: CPT

## 2021-01-12 PROCEDURE — 83036 HEMOGLOBIN GLYCOSYLATED A1C: CPT

## 2021-01-12 PROCEDURE — 85025 COMPLETE CBC W/AUTO DIFF WBC: CPT

## 2021-01-12 PROCEDURE — 82947 ASSAY GLUCOSE BLOOD QUANT: CPT

## 2021-01-12 PROCEDURE — 80053 COMPREHEN METABOLIC PANEL: CPT

## 2021-01-12 PROCEDURE — 6360000002 HC RX W HCPCS: Performed by: INTERNAL MEDICINE

## 2021-01-12 PROCEDURE — 6370000000 HC RX 637 (ALT 250 FOR IP): Performed by: INTERNAL MEDICINE

## 2021-01-12 PROCEDURE — 36415 COLL VENOUS BLD VENIPUNCTURE: CPT

## 2021-01-12 PROCEDURE — 83735 ASSAY OF MAGNESIUM: CPT

## 2021-01-12 NOTE — PROGRESS NOTES
Asked pt if he had any other areas of rashes or wounds pt stated no. Asked pt if he knew if he had anything on his bottom, pt stated no. Questioned pt if staff could inspect. Pt stated no.   Electronically signed by Joyce Guillory RN on 1/12/2021 at 6:03 AM

## 2021-01-12 NOTE — DISCHARGE SUMMARY
Discharge Summary - AGAINST MEDICAL Gladys Farmers  :  1955  MRN:  [id number]    Admit date:  2021  Discharge date:  2021    Admitting Physician:  No admitting provider for patient encounter. Advance Directive: Prior    Consults: cardiology    Primary Care Physician:  No primary care provider on file. Discharge Diagnoses: Active Problems:    Congestive heart failure due to cardiomyopathy (HCC)    Acute combined systolic (congestive) and diastolic (congestive) heart failure (HCC)    Tobacco use    Non compliance w medication regimen    Pulmonary embolism (HCC)    COPD (chronic obstructive pulmonary disease) (HCC)  Resolved Problems:    * No resolved hospital problems.  *      Significant Diagnostic Studies:   Echo Complete    Result Date: 2021 LVOT: 2.2 cm  Doppler Measurements:   AV Peak Gradient: 3.21 mmHg         MV Peak E-Wave: 111 cm/s   TR Velocity:307 cm/s                MV Peak Gradient: 4.93 mmHg  TR Gradient:37.7 mmHg               MV P1/2t: 36 msec  Estimated RAP:20 mmHg               MVA by PHT6.11 cm^2  RVSP:58 mmHg      Xr Chest Portable    Result Date: 1/11/2021  Examination. XR CHEST PORTABLE 1/11/2021 12:05 AM History: Shortness of breath. A single frontal portable upright view of the chest is compared with the previous study dated 11/17/2015. There is interstitial infiltrate in the lungs bilaterally and ill-defined nodular opacities in the right mid the right lower lung. There is a lobulated fullness of the right hilum which may represent mass or lymphadenopathy. There is a small right basal pleural effusion. There are no pulmonary congestion or pneumothorax. Bilateral lung infiltrate, more severe on the right side. Moderate fullness of the right hilum may represent infiltrate/or lymphadenopathy. A small right basal pleural effusion. The above study was initially reviewed and reported by stat [facility]. I do not find any discrepancies. Signed by Dr Zohreh Gonzales on 1/11/2021 6:51 AM      Pertinent Labs:   CBC:   Recent Labs     01/11/21  0100 01/12/21  0240   WBC 10.6 11.6*   HGB 14.5 13.1*    343     BMP:    Recent Labs     01/11/21  0100 01/11/21  0537 01/12/21  0240   *  --  139   K 4.3 4.3 4.5   CL 96*  --  99   CO2 28  --  26   BUN 13  --  20   CREATININE 0.7  --  1.0   GLUCOSE 178*  --  161*     INR:   Recent Labs     01/11/21  0100   INR 1.24*     ABGs:No results for input(s): PH, PO2, PCO2, HCO3, BE, O2SAT in the last 72 hours. Lactic Acid:No results for input(s): LACTA in the last 72 hours.     Procedures: None Hospital Course: 71-year-old male with a past medical history of tobacco abuse, systolic/diastolic CHF, pulmonary embolism (noted on CT 11/5/2020 @ [facility]), COPD noncompliant with medications presenting to the hospital for shortness of breath found to have elevated BNP and elevated troponin with cardiology consulted on admission. Patient was also noted to have pneumonia and started on empiric antibiotics. Patient left AGAINST MEDICAL ADVICE in the middle the night. Physical Exam:  Vitals: BP 91/63   Pulse 103   Temp 97.5 °F (36.4 °C) (Temporal)   Resp 16   Ht 6' 1\" (1.854 m)   Wt 206 lb 9.6 oz (93.7 kg)   SpO2 90%   BMI 27.26 kg/m²   24HR INTAKE/OUTPUT:      Intake/Output Summary (Last 24 hours) at 1/12/2021 1307  Last data filed at 1/11/2021 1836  Gross per 24 hour   Intake 360 ml   Output 700 ml   Net -340 ml     Physical examination not performed on the patient. Discharge Medications/Discharge Instructions/Disposition:   Patient left the hospital AGAINST MEDICAL ADVICE. Time spent on discharge 3 minutes.     Signed:  Justyna Miller MD 1/12/2021 1:07 PM

## 2021-01-12 NOTE — PROGRESS NOTES
Pt called out and stated that he was leaving. Went to pts room to talk to him about why he decided to leave. Pt stated that he was not being fed. Showed pt that he had a sandwich container on his bedside table where he had been fed and 2 different glasses one with water and one with a soda. Pt stated that no one was caring for the wound on his foot. Explained to pt that he didn't report wound on his foot till almost midnight and that staff took pictures and charted the area of his concern and that the dr would be notified on arrival. Pt stated \"this place is a [place], that place wasn't there till I put on those socks. \" explained to pt that that area could not have developed overnight. Pt stated \"2 days ago\" pt was admitted less than 24 hours ago. Pt stated that he was not being cared for and he \"has to do something else. \" explained to pt that he had not been here long enough to see results from treatments and the dr would evaluate the are of concern on his foot. Pt stated again that he was not staying and that he had ride on the way. Apologized to pt that he felt as if his needs were not being met, pt stated, \"Im not mad at you, its not your fault, I know theres a chain of command. But I have to do something different and they will be here soon. \" Pt alert and oriented at present time. ama paper signed. Iv removed with cathlon intact. Telemetry removed and returned.  notifed.    Electronically signed by Renetta Lozano RN on 1/12/2021 at 6:49 AM

## 2021-01-18 NOTE — PROGRESS NOTES
Physician Progress Note      Luz Maria Vicente  [facility] #:                  [id number]  :                       1955  ADMIT DATE:       2021 12:46 AM  DISCH DATE:        2021 7:02 AM  RESPONDING  PROVIDER #:        Marge Lemus MD          QUERY TEXT:    Patient admitted with CHF. Noted documentation of acute respiratory failure in   H&P. In order to support the diagnosis of acute respiratory failure, please   include additional clinical indicators in your documentation. Or please   document if the diagnosis of acute respiratory failure has been ruled out   after further study. The medical record reflects the following:  Risk Factors: CHF exacerbation hx corpulmonale  Clinical Indicators: ER doc-tachypnea,rales  RR /, no abg's,   sat   90%  placed on O2 3l for short period only  Treatment: O2 3L for approx. 3 hours, left AMA  Amrik Lemus BSN    Acute Respiratory Failure Clinical Indicators per [employer] [employer] Training Guide and   Quick Reference Guide:  pO2 < 60 mmHg or SpO2 (pulse oximetry) < 91% breathing room air  pCO2 > 50 and pH < 7.35  P/F ratio (pO2 / FIO2) < 300  pO2 decrease or pCO2 increase by 10 mmHg from baseline (if known)  Supplemental oxygen of 40% or more  Presence of respiratory distress, tachypnea, dyspnea, shortness of breath,   wheezing  Unable to speak in complete sentences  Use of accessory muscles to breathe  Extreme anxiety and feeling of impending doom  Tripod position  Confusion/altered mental status/obtunded  Options provided:  -- Acute on Chronic Respiratory Failure as evidenced by, Please document   evidence.   -- Acute on chronic Respiratory Failure ruled out after study  -- Other - I will add my own diagnosis  -- Disagree - Not applicable / Not valid  -- Disagree - Clinically unable to determine / Unknown  -- Refer to Clinical Documentation Reviewer    PROVIDER RESPONSE TEXT: Acute on chronic Respiratory Failure has been ruled out after study.     Query created by: Mary Ashford on 1/18/2021 6:04 PM      Electronically signed by:  Sheri Smith MD 1/18/2021 6:12 PM

## 2021-02-02 ENCOUNTER — TELEPHONE (OUTPATIENT)
Dept: CARDIOLOGY CLINIC | Age: 66
End: 2021-02-02

## 2021-02-03 ENCOUNTER — TELEPHONE (OUTPATIENT)
Dept: CARDIOLOGY CLINIC | Age: 66
End: 2021-02-03

## 2021-02-04 ENCOUNTER — OFFICE VISIT (OUTPATIENT)
Dept: PRIMARY CARE CLINIC | Age: 66
End: 2021-02-04
Payer: MEDICARE

## 2021-02-04 ENCOUNTER — TELEPHONE (OUTPATIENT)
Dept: CARDIOLOGY CLINIC | Age: 66
End: 2021-02-04

## 2021-02-04 ENCOUNTER — OFFICE VISIT (OUTPATIENT)
Dept: CARDIOLOGY CLINIC | Age: 66
End: 2021-02-04
Payer: MEDICARE

## 2021-02-04 VITALS
TEMPERATURE: 97.9 F | HEIGHT: 73 IN | BODY MASS INDEX: 26.83 KG/M2 | HEART RATE: 101 BPM | DIASTOLIC BLOOD PRESSURE: 64 MMHG | OXYGEN SATURATION: 97 % | WEIGHT: 202.4 LBS | SYSTOLIC BLOOD PRESSURE: 128 MMHG

## 2021-02-04 VITALS
HEIGHT: 73 IN | SYSTOLIC BLOOD PRESSURE: 128 MMHG | HEART RATE: 96 BPM | BODY MASS INDEX: 26.77 KG/M2 | DIASTOLIC BLOOD PRESSURE: 64 MMHG | WEIGHT: 202 LBS

## 2021-02-04 DIAGNOSIS — I50.43 ACUTE ON CHRONIC COMBINED SYSTOLIC AND DIASTOLIC CONGESTIVE HEART FAILURE (HCC): ICD-10-CM

## 2021-02-04 DIAGNOSIS — I42.0 DILATED CARDIOMYOPATHY (HCC): ICD-10-CM

## 2021-02-04 DIAGNOSIS — I50.9 CONGESTIVE HEART FAILURE DUE TO CARDIOMYOPATHY (HCC): ICD-10-CM

## 2021-02-04 DIAGNOSIS — I42.9 CONGESTIVE HEART FAILURE DUE TO CARDIOMYOPATHY (HCC): Primary | ICD-10-CM

## 2021-02-04 DIAGNOSIS — F17.200 SMOKER: ICD-10-CM

## 2021-02-04 DIAGNOSIS — E11.9 TYPE 2 DIABETES MELLITUS WITHOUT COMPLICATION, WITHOUT LONG-TERM CURRENT USE OF INSULIN (HCC): ICD-10-CM

## 2021-02-04 DIAGNOSIS — Z86.711 HISTORY OF PULMONARY EMBOLISM: ICD-10-CM

## 2021-02-04 DIAGNOSIS — I50.9 CONGESTIVE HEART FAILURE DUE TO CARDIOMYOPATHY (HCC): Primary | ICD-10-CM

## 2021-02-04 DIAGNOSIS — I42.9 CONGESTIVE HEART FAILURE DUE TO CARDIOMYOPATHY (HCC): ICD-10-CM

## 2021-02-04 DIAGNOSIS — I26.94 MULTIPLE SUBSEGMENTAL PULMONARY EMBOLI WITHOUT ACUTE COR PULMONALE (HCC): Primary | ICD-10-CM

## 2021-02-04 LAB
CHP ED QC CHECK: NORMAL
GLUCOSE BLD-MCNC: 227 MG/DL

## 2021-02-04 PROCEDURE — 82962 GLUCOSE BLOOD TEST: CPT | Performed by: FAMILY MEDICINE

## 2021-02-04 PROCEDURE — 3051F HG A1C>EQUAL 7.0%<8.0%: CPT | Performed by: FAMILY MEDICINE

## 2021-02-04 PROCEDURE — 4004F PT TOBACCO SCREEN RCVD TLK: CPT | Performed by: INTERNAL MEDICINE

## 2021-02-04 PROCEDURE — 1111F DSCHRG MED/CURRENT MED MERGE: CPT | Performed by: FAMILY MEDICINE

## 2021-02-04 PROCEDURE — 99214 OFFICE O/P EST MOD 30 MIN: CPT | Performed by: INTERNAL MEDICINE

## 2021-02-04 PROCEDURE — G8484 FLU IMMUNIZE NO ADMIN: HCPCS | Performed by: INTERNAL MEDICINE

## 2021-02-04 PROCEDURE — G8428 CUR MEDS NOT DOCUMENT: HCPCS | Performed by: INTERNAL MEDICINE

## 2021-02-04 PROCEDURE — 1111F DSCHRG MED/CURRENT MED MERGE: CPT | Performed by: INTERNAL MEDICINE

## 2021-02-04 PROCEDURE — 3017F COLORECTAL CA SCREEN DOC REV: CPT | Performed by: INTERNAL MEDICINE

## 2021-02-04 PROCEDURE — 99214 OFFICE O/P EST MOD 30 MIN: CPT | Performed by: FAMILY MEDICINE

## 2021-02-04 PROCEDURE — G8419 CALC BMI OUT NRM PARAM NOF/U: HCPCS | Performed by: INTERNAL MEDICINE

## 2021-02-04 RX ORDER — FUROSEMIDE 20 MG/1
20 TABLET ORAL 2 TIMES DAILY
Qty: 60 TABLET | Refills: 0 | Status: SHIPPED | OUTPATIENT
Start: 2021-02-04

## 2021-02-04 RX ORDER — ALBUTEROL SULFATE 90 UG/1
2 AEROSOL, METERED RESPIRATORY (INHALATION) EVERY 4 HOURS PRN
COMMUNITY
Start: 2021-02-02

## 2021-02-04 RX ORDER — BLOOD-GLUCOSE METER
1 KIT MISCELLANEOUS DAILY
Qty: 1 KIT | Refills: 0 | Status: SHIPPED | OUTPATIENT
Start: 2021-02-04

## 2021-02-04 RX ORDER — BUPROPION HYDROCHLORIDE 150 MG/1
150 TABLET ORAL EVERY MORNING
Qty: 30 TABLET | Refills: 0 | Status: SHIPPED | OUTPATIENT
Start: 2021-02-04

## 2021-02-04 RX ORDER — POTASSIUM CHLORIDE 750 MG/1
10 TABLET, EXTENDED RELEASE ORAL 2 TIMES DAILY
Qty: 60 TABLET | Refills: 0 | Status: SHIPPED | OUTPATIENT
Start: 2021-02-04

## 2021-02-04 RX ORDER — NICOTINE 21 MG/24HR
1 PATCH, TRANSDERMAL 24 HOURS TRANSDERMAL DAILY
Qty: 42 PATCH | Refills: 0 | Status: SHIPPED | OUTPATIENT
Start: 2021-02-04 | End: 2021-03-18

## 2021-02-04 RX ORDER — GLIPIZIDE 5 MG/1
5 TABLET, FILM COATED, EXTENDED RELEASE ORAL DAILY
Qty: 30 TABLET | Refills: 0 | Status: SHIPPED | OUTPATIENT
Start: 2021-02-04

## 2021-02-04 ASSESSMENT — ENCOUNTER SYMPTOMS
TROUBLE SWALLOWING: 0
ABDOMINAL PAIN: 0
NAUSEA: 0
BACK PAIN: 1
SHORTNESS OF BREATH: 0
VOMITING: 0
CHEST TIGHTNESS: 0
EYES NEGATIVE: 1
GASTROINTESTINAL NEGATIVE: 1
VOMITING: 0
NAUSEA: 0
COUGH: 1
SHORTNESS OF BREATH: 0
WHEEZING: 0
DIARRHEA: 0
CONSTIPATION: 0
DIARRHEA: 0
SORE THROAT: 0
EYE PAIN: 0
RESPIRATORY NEGATIVE: 1

## 2021-02-04 ASSESSMENT — PATIENT HEALTH QUESTIONNAIRE - PHQ9
SUM OF ALL RESPONSES TO PHQ QUESTIONS 1-9: 0
SUM OF ALL RESPONSES TO PHQ9 QUESTIONS 1 & 2: 0
SUM OF ALL RESPONSES TO PHQ QUESTIONS 1-9: 0

## 2021-02-04 NOTE — PROGRESS NOTES
[facility] Progress Note                            Date:  2/4/2021  Patient: Lazarus Drown  Age:  60 y.o., 1955      Reason for evaluation:         SUBJECTIVE:    Returns today follow-up assessment recently seen 1/11/2021 when he was admitted with shortness of breath and edema he ran out of his medications a week ago. Troponins minimally elevated. Echocardiogram performed document ejection fraction 20% or less. He now reports that he had a heart cath at [facility] several months ago. I note that he is on diuretics and beta-blockers but no ACE inhibitor's etc. for unclear reasons. Also tells me that he was recently diagnosed with pulmonary embolism and apparently was readmitted discharged 2 days ago. Now on anticoagulation. Feels somewhat better today. Cardiac cath on review 5/12/2020 showed fairly unremarkable coronary arteries nonischemic cardiomyopathy normal right-sided filling pressures mildly elevated left-sided filling pressures low cardiac output mild normal pulmonary vascular resistance. This was performed for new onset congestive heart failure no mention made of the left ventriculogram.    Review of Systems   Constitutional: Negative. Negative for chills, fever and unexpected weight change. HENT: Negative. Eyes: Negative. Respiratory: Negative. Negative for shortness of breath. Cardiovascular: Negative. Negative for chest pain. Gastrointestinal: Negative. Negative for diarrhea, nausea and vomiting. Endocrine: Negative. Genitourinary: Negative. Musculoskeletal: Negative. Skin: Negative. Neurological: Negative. All other systems reviewed and are negative. OBJECTIVE:     /64   Pulse 96   Ht 6' 1\" (1.854 m)   Wt 202 lb (91.6 kg)   BMI 26.65 kg/m²     Labs:   CBC: No results for input(s): WBC, HGB, HCT, PLT in the last 72 hours.   BMP:  Recent Labs     02/04/21   GLUCOSE 227 BNP: No results for input(s): BNP in the last 72 hours. PT/INR: No results for input(s): PROTIME, INR in the last 72 hours. APTT:No results for input(s): APTT in the last 72 hours. CARDIAC ENZYMES:No results for input(s): CKTOTAL, CKMB, CKMBINDEX, TROPONINI in the last 72 hours. FASTING LIPID PANEL:  Lab Results   Component Value Date    HDL 34 01/12/2021    LDLCALC 56 01/12/2021    TRIG 72 01/12/2021     LIVER PROFILE:No results for input(s): AST, ALT, LABALBU in the last 72 hours.         Past Medical History:   Diagnosis Date    Back pain     CHF (congestive heart failure) (HCC)     Chronic back pain     Diabetes mellitus ([address]      Past Surgical History:   Procedure Laterality Date    TONSILLECTOMY      patient was 6     Family History   Problem Relation Age of Onset    Lung Cancer Mother     Heart Attack Father     Prostate Cancer Father     High Blood Pressure Father     Diabetes Father     Heart Failure Brother     Thyroid Cancer Brother     Diabetes Brother     Diabetes Paternal Aunt     Diabetes Paternal Uncle     Diabetes Maternal Grandmother     Diabetes Maternal Grandfather     Diabetes Paternal Grandmother     Diabetes Paternal Grandfather     Heart Attack Paternal Grandfather     Diabetes Brother      Allergies   Allergen Reactions    Penicillins      As child     Current Outpatient Medications   Medication Sig Dispense Refill    glipiZIDE (GLUCOTROL XL) 5 MG extended release tablet Take 1 tablet by mouth daily 30 tablet 0    furosemide (LASIX) 20 MG tablet Take 1 tablet by mouth 2 times daily 60 tablet 0    potassium chloride (KLOR-CON M) 10 MEQ extended release tablet Take 1 tablet by mouth 2 times daily 60 tablet 0    nicotine (NICODERM CQ) 14 MG/24HR Place 1 patch onto the skin daily 42 patch 0    buPROPion (WELLBUTRIN XL) 150 MG extended release tablet Take 1 tablet by mouth every morning 30 tablet 0  glucose monitoring kit (FREESTYLE) monitoring kit 1 kit by Does not apply route daily 1 kit 0    Carvedilol (COREG PO) Take 6.25 mg by mouth 2 times daily       aspirin 81 MG EC tablet Take 81 mg by mouth daily      budesonide-formoterol (SYMBICORT) 160-4.5 MCG/ACT AERO Inhale 2 puffs into the lungs Twice daily      potassium chloride (MICRO-K) 10 MEQ extended release capsule Take 10 mEq by mouth daily      HYDROcodone-acetaminophen (NORCO)  MG per tablet Take 1 tablet by mouth as needed for Pain. No current facility-administered medications for this visit.       Social History     Socioeconomic History    Marital status: Single     Spouse name: Not on file    Number of children: Not on file    Years of education: Not on file    Highest education level: Not on file   Occupational History    Not on file   Social Needs    Financial resource strain: Not on file    Food insecurity     Worry: Not on file     Inability: Not on file    Transportation needs     Medical: Not on file     Non-medical: Not on file   Tobacco Use    Smoking status: Current Every Day Smoker     Packs/day: 0.25     Years: 40.00     Pack years: 10.00     Types: Cigarettes    Smokeless tobacco: Never Used    Tobacco comment: patient given patches    Substance and Sexual Activity    Alcohol use: Not Currently     Comment: +hx of chronic alcoholism (per [facility])    Drug use: Yes     Types: Methamphetamines     Comment: recently tested positive at [address] Sexual activity: Not on file   Lifestyle    Physical activity     Days per week: Not on file     Minutes per session: Not on file    Stress: Not on file   Relationships    Social connections     Talks on phone: Not on file     Gets together: Not on file     Attends Congregational service: Not on file     Active member of club or organization: Not on file     Attends meetings of clubs or organizations: Not on file     Relationship status: Not on file  Intimate partner violence     Fear of current or ex partner: Not on file     Emotionally abused: Not on file     Physically abused: Not on file     Forced sexual activity: Not on file   Other Topics Concern    Not on file   Social History Narrative    Has worked as a carpenterNever marriedNo children[place] in the [place] [id number] at home with his nieceHe does drive an automLincare reported alcohol or substance usage smokes 1 pack/day       Physical Examination:  /64   Pulse 96   Ht 6' 1\" (1.854 m)   Wt 202 lb (91.6 kg)   BMI 26.65 kg/m²   Physical Exam        ASSESSMENT:     Diagnosis Orders   1. Congestive heart failure due to cardiomyopathy ([address]     2. Dilated cardiomyopathy ([address]     3. History of pulmonary embolism         PLAN:  No orders of the defined types were placed in this encounter. No orders of the defined types were placed in this encounter. 1. Continue present medications  2. Recommend follow-up assessment in 1 month  3. Obtain copy of recent cath report. 4. May need to be considered for possible ICD placement at some point. Return in about 4 weeks (around 3/4/2021) for return to Dr. Bebeto Bhagat only. Maki Carpenter MD 2/4/2021 11:48 AM [facility]      Thisdictation was generated by voice recognition computer software. Although all attempts are made to edit the dictation for accuracy, there may be errors in the transcription that are not intended.

## 2021-02-04 NOTE — PROGRESS NOTES
[address]  Date of procedure: 5/12/2020     Procedures performed:     1. Selective coronary angiography  2. Left heart catheterization  3. Right heart catheterization  4.  Ultrasound-guided vascular access  5. Supervision of the administration of moderate sedation     Risk, Benefits, and Alternatives discussed with the patient and/or family. Plan is for moderate sedation, and the patient agrees to proceed with the   procedure.  An immediate assessment was done prior to the administration   of moderate sedation    Indication: new onset systolic CHF  Premedication: Versed, Fentanyl  Contrast: Isovue 370 - 90 ml to patient  Radiation: Flouro time= 3:30. Air Kerma= 313.29  Catheters: 5F Ashish, 7Fr Bethune-Geronimo      Procedural details  The patient was brought to the cath lab and prepped and draped in the   usual fashion.  With ultrasound guidance, a Seldinger technique was used   to place a 7 Fr sheath in the right internal jugular vein.  Next, local   anesthetic was applied over the right radial artery.  A Seldinger   technique was again employed to place a 5/6 Western Mariam sheath into the right   radial artery without difficulty.      Through the IJ sheath, a Bethune-Geronimo catheter was advanced and pressure   tracings were obtained in the RA, RV, PA, and PCW positions.  While in the   PA, blood was sampled for performance of oximetry to assist in calculation   of cardiac output via assumed Taran method.  Another blood sample was   removed through the radial arterial sheath for performance of oximetry to   be used in the assumed Taran calculation.     At this point, attention was turned to the arterial portion of the case.    A 5 Fr Ashish catheter was advanced over a .035\" wire into the ascending   aorta to perform diagnostic angiography.  After engaging both the left and   right coronary system to complete diagnostic angiography, it was then advanced across the aortic valve into the ventricle over a wire to assess   left ventricular pressures.  Pullback gradient was assessed across aortic   valve.  All equipment was then removed and a TR band was applied over the   radial arteriotomy site to achieve hemostasis.  Venous sheath was removed   and manual pressure applied to achieve hemostasis over the venotomy site.    There were no obvious complications and the patient was transferred to the   [facility] in hemodynamically stable condition    Lastly, moderate sedation was administered throughout the case by nursing   staff under my supervision.  Intravenous fentanyl and Versed were   administered with first dose given at 1123.  Continuous pulse oximetry,   blood pressure, and heart rate monitoring was performed throughout the   case, along with patient assessments.  The end of my face-to-face   encounter with the patient was at 1150, accounting for a total of 27   minutes of supervised administration a moderate sedation.      Findings:    Ultrasound examination of the right neck: Right internal jugular vein was   identified in typical anterolateral location to the right internal   internal carotid artery.  Vein was fully compressible and there was no   evidence of thrombus.  Pulsatile flow was confirmed in the artery using   color Doppler.  Needle seen directly entering the anterior surface of the   vein.     Hemodynamics:  Ao= 83/71, mean 77, LV= 95, LVEDP=17, AV grad=neglibile    Selective coronary angiography:   Dominance: right  Left Main coronary artery: Arises normally from the left cusp and   bifurcates.  Normal.  Left anterior descending artery: Large vessel arising normally from the   distal left main that supplies to high diagonal branches and multiple   septal perforators, as it courses the anterior interventricular groove,   ultimately terminating at the apex.  Diffuse mild disease.    Left circumflex: Moderate to large vessel arising normally from the distal   left main that is not dominant for posterior circulation.  Supplies one   moderate to large obtuse marginal branch.  Minor luminal irregularities. Right coronary artery: Large vessel arising normally from the right   coronary cusp that is dominant for posterior circulation, supplying a   moderately sized PDA and then 3 PL branches, the second and third of which   are moderate to large in size.  Minor luminal irregularities.     Right heart catheterization:  Hemodynamics  RA: 8  RV: 32/8  PA: 32/13, m22  PCW: 18    Oximetry  PA: 64%  Ao: 94%    Cardiac output and index via assumed Taran method: 4.5 L/min, 2.1 L/min/m2    Transpulmonary gradient: 4  Pulmonary vascular resistance: <1 Wood unit    Estimated Blood Loss: 2mL  Specimens: None  Complications: None     Impression:  1.  Near-normal coronary arteries; i.e. nonischemic cardiomyopathy  2.  Normal right-sided filling pressure with mildly elevated left-sided   filling pressure (LVEDP/PCW)  3.  Mildly low cardiac output  4.  Normal pulmonary vascular resistance    Plan:   -Transition from IV to oral diuretics and make sure patient remains   euvolemic, preferably overnight and consider discharge home tomorrow  -Optimize medical therapy with frequent outpatient titrations towards goal   doses of guideline directed medical therapy for systolic heart failure  -LifeVest prior to discharge      Shae Izaguirre MD

## 2021-02-04 NOTE — PROGRESS NOTES
Post-Discharge Transitional Care Management Services or Hospital Follow Up      Shanice Cornell   YOB: 1955    Date of Office Visit:  2/4/2021  Date of Hospital Admission: 1/31/2021  Date of Hospital Discharge: 2/2/2021    Care management risk score Rising risk (score 2-5) and Complex Care (Scores >=6): 3     Non face to face  following discharge, date last encounter closed (first attempt may have been earlier): Pt was d/c after 48 hours before this  visit     Call initiated 2 business days of discharge: no call performed, not required    Patient Active Problem List   Diagnosis    Congestive heart failure due to cardiomyopathy ([address]    Acute combined systolic (congestive) and diastolic (congestive) heart failure ([address]    Acute pulmonary embolism with acute cor pulmonale ([address]    Acute respiratory failure with hypoxia ([address]    NICM (nonischemic cardiomyopathy) ([address]    Orthopnea    Tobacco use    Type 2 diabetes mellitus, without long-term current use of insulin ([address]    Non compliance w medication regimen    Pulmonary embolism ([address]    COPD (chronic obstructive pulmonary disease) ([address]    Dilated cardiomyopathy ([address]       Allergies   Allergen Reactions    Penicillins      As child       Medications listed as ordered at the time of discharge from hospital      Medications marked \"taking\" at this time  Outpatient Medications Marked as Taking for the 2/4/21 encounter (Office Visit) with Brandee Raymundo MD   Medication Sig Dispense Refill    glipiZIDE (GLUCOTROL XL) 5 MG extended release tablet Take 1 tablet by mouth daily 30 tablet 0    furosemide (LASIX) 20 MG tablet Take 1 tablet by mouth 2 times daily 60 tablet 0    potassium chloride (KLOR-CON M) 10 MEQ extended release tablet Take 1 tablet by mouth 2 times daily 60 tablet 0    nicotine (NICODERM CQ) 14 MG/24HR Place 1 patch onto the skin daily 42 patch 0  buPROPion (WELLBUTRIN XL) 150 MG extended release tablet Take 1 tablet by mouth every morning 30 tablet 0    glucose monitoring kit (FREESTYLE) monitoring kit 1 kit by Does not apply route daily 1 kit 0    Carvedilol (COREG PO) Take 6.25 mg by mouth 2 times daily       aspirin 81 MG EC tablet Take 81 mg by mouth daily      budesonide-formoterol (SYMBICORT) 160-4.5 MCG/ACT AERO Inhale 2 puffs into the lungs Twice daily      potassium chloride (MICRO-K) 10 MEQ extended release capsule Take 10 mEq by mouth daily      HYDROcodone-acetaminophen (NORCO)  MG per tablet Take 1 tablet by mouth as needed for Pain. Medications patient taking as of now reconciled against medications ordered at time of hospital discharge: Yes    Chief Complaint   Patient presents with    Follow-Up from Hospital     pulmonary embolism        HPI  Pt is here for follow-up of recent hospitalization for pulmonary embolism. He was admitted at [facility] [place] with shortness of breath and chest pain. W-up revealed PE. Pt reported to be non compliant with anticoagulation. While in hospital , he was started on Lovenox and then later switched to Eliquis. Other co- morbidities include : CHF due to cardiomyopathy, COPD, Type 2 DM, Degenerative Disc Disease, poor compliance to medical treatment. Review of his social history reveals chronic alcoholism, chronic tobacco use and illicit drug use including Methamphetamine. Pt is disabled. He injured his neck and back after he fell 22 feet off a scaffolding 2016. He lives with his brother and sister in law. Inpatient course: Discharge summary reviewed- see chart. Interval history/Current status: Pt states he is no longer short of breath. His legs are still markedly swollen. He is a diabetic but is not on any medication. Pt is not able to monitor his BS bec he has no glucometer for home use. He has appt to see cardiologist today.     Vitals:    02/04/21 1012   BP: 128/64

## 2021-03-12 ENCOUNTER — TELEPHONE (OUTPATIENT)
Dept: PRIMARY CARE CLINIC | Age: 66
End: 2021-03-12

## 2021-03-12 NOTE — TELEPHONE ENCOUNTER
Unfortunately, we can't do much for him if he does not want to comply with our treatment plan and that includes keeping his follow-up appt. I have no problem seeing him one more time but if he does not keep that appointment, I would rather be taking care of someone else. A warning letter maybe to let him know that he is on the brink of being dismissed from this practice.

## 2021-03-12 NOTE — TELEPHONE ENCOUNTER
3-1-19  -   [facility] - New patient appointment    2-4-21  [facility] F/u completed with Dr. Garret Steven    2-17-21   -  Establish care with Dr. Jai Valle appointment    3-11-21 - Establish care with Dr. Nancy Valencia     25%  No Show  Rate      How would everyone like to proceed?

## 2021-03-16 NOTE — TELEPHONE ENCOUNTER
I would dismiss based on the 2 new doc no shows but If Dr. Rikki Vallejo would like to give him another chance she could since she's listed as his doctor